# Patient Record
(demographics unavailable — no encounter records)

---

## 2024-12-05 NOTE — HISTORY OF PRESENT ILLNESS
[FreeTextEntry1] : RARE PALPITATIONS- WHEN SHE TOOK VASCEPA AT NIGHT SHE DEVELOPED PALPITATIONS SO SHE ONLY TAKES IT IN MORNING NO CP/SOB

## 2024-12-05 NOTE — DISCUSSION/SUMMARY
[FreeTextEntry1] : EKG:WNL TTE: MVP,MR/TR continue Vascepa + Lipitor for HLD;norvasc + ramipril  for HPTN no RX for rare palpitations- advised top call if increases